# Patient Record
Sex: MALE | Race: BLACK OR AFRICAN AMERICAN | Employment: FULL TIME | URBAN - METROPOLITAN AREA
[De-identification: names, ages, dates, MRNs, and addresses within clinical notes are randomized per-mention and may not be internally consistent; named-entity substitution may affect disease eponyms.]

---

## 2022-05-13 ENCOUNTER — HOSPITAL ENCOUNTER (INPATIENT)
Age: 32
LOS: 1 days | Discharge: HOME | DRG: 871 | End: 2022-05-14
Attending: EMERGENCY MEDICINE | Admitting: INTERNAL MEDICINE

## 2022-05-13 ENCOUNTER — APPOINTMENT (OUTPATIENT)
Dept: CT IMAGING | Age: 32
DRG: 871 | End: 2022-05-13

## 2022-05-13 ENCOUNTER — APPOINTMENT (OUTPATIENT)
Dept: GENERAL RADIOLOGY | Age: 32
DRG: 871 | End: 2022-05-13

## 2022-05-13 DIAGNOSIS — J69.0 ASPIRATION PNEUMONIA, UNSPECIFIED ASPIRATION PNEUMONIA TYPE, UNSPECIFIED LATERALITY, UNSPECIFIED PART OF LUNG (HCC): ICD-10-CM

## 2022-05-13 DIAGNOSIS — J96.01 ACUTE RESPIRATORY FAILURE WITH HYPOXEMIA (HCC): Primary | ICD-10-CM

## 2022-05-13 DIAGNOSIS — R56.9 SEIZURE-LIKE ACTIVITY (HCC): ICD-10-CM

## 2022-05-13 DIAGNOSIS — R41.82 ALTERED MENTAL STATUS, UNSPECIFIED ALTERED MENTAL STATUS TYPE: ICD-10-CM

## 2022-05-13 PROBLEM — F14.10 COCAINE ABUSE (HCC): Status: ACTIVE | Noted: 2022-05-13

## 2022-05-13 PROBLEM — Z72.0 TOBACCO ABUSE: Status: ACTIVE | Noted: 2022-05-13

## 2022-05-13 PROBLEM — F12.10 CANNABIS ABUSE: Status: ACTIVE | Noted: 2022-05-13

## 2022-05-13 LAB
A/G RATIO: 1.4 (ref 1.1–2.2)
ACETAMINOPHEN LEVEL: <5 UG/ML (ref 10–30)
ALBUMIN SERPL-MCNC: 4.6 G/DL (ref 3.4–5)
ALP BLD-CCNC: 67 U/L (ref 40–129)
ALT SERPL-CCNC: 38 U/L (ref 10–40)
AMPHETAMINE SCREEN, URINE: ABNORMAL
ANION GAP SERPL CALCULATED.3IONS-SCNC: 19 MMOL/L (ref 3–16)
APTT: 27.2 SEC (ref 26.2–38.6)
AST SERPL-CCNC: 60 U/L (ref 15–37)
BACTERIA: NORMAL /HPF
BARBITURATE SCREEN URINE: ABNORMAL
BASE EXCESS VENOUS: -8.1 MMOL/L (ref -3–3)
BASOPHILS ABSOLUTE: 0.1 K/UL (ref 0–0.2)
BASOPHILS RELATIVE PERCENT: 0.8 %
BENZODIAZEPINE SCREEN, URINE: ABNORMAL
BILIRUB SERPL-MCNC: 0.3 MG/DL (ref 0–1)
BILIRUBIN URINE: NEGATIVE
BLOOD, URINE: NEGATIVE
BUN BLDV-MCNC: 15 MG/DL (ref 7–20)
CALCIUM SERPL-MCNC: 9.3 MG/DL (ref 8.3–10.6)
CANNABINOID SCREEN URINE: POSITIVE
CARBOXYHEMOGLOBIN: 7.1 % (ref 0–1.5)
CHLORIDE BLD-SCNC: 100 MMOL/L (ref 99–110)
CLARITY: CLEAR
CO2: 20 MMOL/L (ref 21–32)
COCAINE METABOLITE SCREEN URINE: POSITIVE
COLOR: YELLOW
CREAT SERPL-MCNC: 1.3 MG/DL (ref 0.9–1.3)
EKG ATRIAL RATE: 103 BPM
EKG DIAGNOSIS: NORMAL
EKG P AXIS: 53 DEGREES
EKG P-R INTERVAL: 130 MS
EKG Q-T INTERVAL: 380 MS
EKG QRS DURATION: 88 MS
EKG QTC CALCULATION (BAZETT): 497 MS
EKG R AXIS: 57 DEGREES
EKG T AXIS: 48 DEGREES
EKG VENTRICULAR RATE: 103 BPM
EOSINOPHILS ABSOLUTE: 0.1 K/UL (ref 0–0.6)
EOSINOPHILS RELATIVE PERCENT: 0.9 %
EPITHELIAL CELLS, UA: 0 /HPF (ref 0–5)
ETHANOL: 28 MG/DL (ref 0–0.08)
GFR AFRICAN AMERICAN: >60
GFR NON-AFRICAN AMERICAN: >60
GLUCOSE BLD-MCNC: 113 MG/DL (ref 70–99)
GLUCOSE BLD-MCNC: 114 MG/DL (ref 70–99)
GLUCOSE BLD-MCNC: 134 MG/DL (ref 70–99)
GLUCOSE BLD-MCNC: 206 MG/DL (ref 70–99)
GLUCOSE BLD-MCNC: 219 MG/DL (ref 70–99)
GLUCOSE URINE: NEGATIVE MG/DL
HCO3 VENOUS: 22 MMOL/L (ref 23–29)
HCT VFR BLD CALC: 39.9 % (ref 40.5–52.5)
HEMOGLOBIN, VEN, REDUCED: 9 %
HEMOGLOBIN: 12.4 G/DL (ref 13.5–17.5)
HYALINE CASTS: 1 /LPF (ref 0–8)
INR BLD: 0.96 (ref 0.88–1.12)
KETONES, URINE: NEGATIVE MG/DL
LACTIC ACID, SEPSIS: 0.8 MMOL/L (ref 0.4–1.9)
LACTIC ACID, SEPSIS: 3.2 MMOL/L (ref 0.4–1.9)
LACTIC ACID, SEPSIS: 4.9 MMOL/L (ref 0.4–1.9)
LEUKOCYTE ESTERASE, URINE: NEGATIVE
LIPASE: 63 U/L (ref 13–60)
LYMPHOCYTES ABSOLUTE: 3.4 K/UL (ref 1–5.1)
LYMPHOCYTES RELATIVE PERCENT: 25.7 %
Lab: ABNORMAL
MCH RBC QN AUTO: 27.7 PG (ref 26–34)
MCHC RBC AUTO-ENTMCNC: 31.1 G/DL (ref 31–36)
MCV RBC AUTO: 89.2 FL (ref 80–100)
METHADONE SCREEN, URINE: ABNORMAL
METHEMOGLOBIN VENOUS: 0.7 %
MICROSCOPIC EXAMINATION: YES
MONOCYTES ABSOLUTE: 0.9 K/UL (ref 0–1.3)
MONOCYTES RELATIVE PERCENT: 7 %
NEUTROPHILS ABSOLUTE: 8.6 K/UL (ref 1.7–7.7)
NEUTROPHILS RELATIVE PERCENT: 65.6 %
NITRITE, URINE: NEGATIVE
O2 CONTENT, VEN: 15 VOL %
O2 SAT, VEN: 91 %
O2 THERAPY: ABNORMAL
OPIATE SCREEN URINE: ABNORMAL
OXYCODONE URINE: ABNORMAL
PCO2, VEN: 65.9 MMHG (ref 40–50)
PDW BLD-RTO: 13.8 % (ref 12.4–15.4)
PERFORMED ON: ABNORMAL
PH UA: 6.5
PH UA: 6.5 (ref 5–8)
PH VENOUS: 7.13 (ref 7.35–7.45)
PHENCYCLIDINE SCREEN URINE: ABNORMAL
PLATELET # BLD: 353 K/UL (ref 135–450)
PMV BLD AUTO: 8.2 FL (ref 5–10.5)
PO2, VEN: 75.7 MMHG (ref 25–40)
POTASSIUM SERPL-SCNC: 3.7 MMOL/L (ref 3.5–5.1)
PRO-BNP: 85 PG/ML (ref 0–124)
PROPOXYPHENE SCREEN: ABNORMAL
PROTEIN UA: 30 MG/DL
PROTHROMBIN TIME: 10.8 SEC (ref 9.9–12.7)
RBC # BLD: 4.47 M/UL (ref 4.2–5.9)
RBC UA: 1 /HPF (ref 0–4)
SALICYLATE, SERUM: <0.3 MG/DL (ref 15–30)
SODIUM BLD-SCNC: 139 MMOL/L (ref 136–145)
SPECIFIC GRAVITY UA: 1.01 (ref 1–1.03)
TCO2 CALC VENOUS: 54 MMOL/L
TOTAL CK: 348 U/L (ref 39–308)
TOTAL PROTEIN: 7.9 G/DL (ref 6.4–8.2)
TROPONIN: <0.01 NG/ML
URINE REFLEX TO CULTURE: ABNORMAL
URINE TYPE: ABNORMAL
UROBILINOGEN, URINE: 0.2 E.U./DL
WBC # BLD: 13.1 K/UL (ref 4–11)
WBC UA: 2 /HPF (ref 0–5)

## 2022-05-13 PROCEDURE — 85730 THROMBOPLASTIN TIME PARTIAL: CPT

## 2022-05-13 PROCEDURE — 71045 X-RAY EXAM CHEST 1 VIEW: CPT

## 2022-05-13 PROCEDURE — 85025 COMPLETE CBC W/AUTO DIFF WBC: CPT

## 2022-05-13 PROCEDURE — XW03396 INTRODUCTION OF CEFTOLOZANE/TAZOBACTAM ANTI-INFECTIVE INTO PERIPHERAL VEIN, PERCUTANEOUS APPROACH, NEW TECHNOLOGY GROUP 6: ICD-10-PCS | Performed by: INTERNAL MEDICINE

## 2022-05-13 PROCEDURE — 70450 CT HEAD/BRAIN W/O DYE: CPT

## 2022-05-13 PROCEDURE — 83036 HEMOGLOBIN GLYCOSYLATED A1C: CPT

## 2022-05-13 PROCEDURE — 83605 ASSAY OF LACTIC ACID: CPT

## 2022-05-13 PROCEDURE — 2580000003 HC RX 258: Performed by: PHYSICIAN ASSISTANT

## 2022-05-13 PROCEDURE — 80179 DRUG ASSAY SALICYLATE: CPT

## 2022-05-13 PROCEDURE — 83880 ASSAY OF NATRIURETIC PEPTIDE: CPT

## 2022-05-13 PROCEDURE — 80143 DRUG ASSAY ACETAMINOPHEN: CPT

## 2022-05-13 PROCEDURE — 95816 EEG AWAKE AND DROWSY: CPT | Performed by: NEUROMUSCULOSKELETAL MEDICINE & OMM

## 2022-05-13 PROCEDURE — 93005 ELECTROCARDIOGRAM TRACING: CPT | Performed by: PHYSICIAN ASSISTANT

## 2022-05-13 PROCEDURE — 6360000002 HC RX W HCPCS: Performed by: PHYSICIAN ASSISTANT

## 2022-05-13 PROCEDURE — 82550 ASSAY OF CK (CPK): CPT

## 2022-05-13 PROCEDURE — 2580000003 HC RX 258: Performed by: INTERNAL MEDICINE

## 2022-05-13 PROCEDURE — 82803 BLOOD GASES ANY COMBINATION: CPT

## 2022-05-13 PROCEDURE — 83690 ASSAY OF LIPASE: CPT

## 2022-05-13 PROCEDURE — 99285 EMERGENCY DEPT VISIT HI MDM: CPT

## 2022-05-13 PROCEDURE — 6360000002 HC RX W HCPCS: Performed by: INTERNAL MEDICINE

## 2022-05-13 PROCEDURE — 85610 PROTHROMBIN TIME: CPT

## 2022-05-13 PROCEDURE — 36415 COLL VENOUS BLD VENIPUNCTURE: CPT

## 2022-05-13 PROCEDURE — 94761 N-INVAS EAR/PLS OXIMETRY MLT: CPT

## 2022-05-13 PROCEDURE — 2060000000 HC ICU INTERMEDIATE R&B

## 2022-05-13 PROCEDURE — 6370000000 HC RX 637 (ALT 250 FOR IP): Performed by: STUDENT IN AN ORGANIZED HEALTH CARE EDUCATION/TRAINING PROGRAM

## 2022-05-13 PROCEDURE — 95819 EEG AWAKE AND ASLEEP: CPT

## 2022-05-13 PROCEDURE — 93010 ELECTROCARDIOGRAM REPORT: CPT | Performed by: INTERNAL MEDICINE

## 2022-05-13 PROCEDURE — 81001 URINALYSIS AUTO W/SCOPE: CPT

## 2022-05-13 PROCEDURE — 6370000000 HC RX 637 (ALT 250 FOR IP): Performed by: INTERNAL MEDICINE

## 2022-05-13 PROCEDURE — 80307 DRUG TEST PRSMV CHEM ANLYZR: CPT

## 2022-05-13 PROCEDURE — 87449 NOS EACH ORGANISM AG IA: CPT

## 2022-05-13 PROCEDURE — 82077 ASSAY SPEC XCP UR&BREATH IA: CPT

## 2022-05-13 PROCEDURE — 96360 HYDRATION IV INFUSION INIT: CPT

## 2022-05-13 PROCEDURE — 95816 EEG AWAKE AND DROWSY: CPT | Performed by: INTERNAL MEDICINE

## 2022-05-13 PROCEDURE — 80053 COMPREHEN METABOLIC PANEL: CPT

## 2022-05-13 PROCEDURE — 84484 ASSAY OF TROPONIN QUANT: CPT

## 2022-05-13 PROCEDURE — 99253 IP/OBS CNSLTJ NEW/EST LOW 45: CPT | Performed by: NEUROMUSCULOSKELETAL MEDICINE & OMM

## 2022-05-13 PROCEDURE — 2700000000 HC OXYGEN THERAPY PER DAY

## 2022-05-13 RX ORDER — POTASSIUM CHLORIDE 7.45 MG/ML
10 INJECTION INTRAVENOUS PRN
Status: DISCONTINUED | OUTPATIENT
Start: 2022-05-13 | End: 2022-05-14 | Stop reason: HOSPADM

## 2022-05-13 RX ORDER — CLONIDINE HYDROCHLORIDE 0.1 MG/1
0.1 TABLET ORAL PRN
Status: DISCONTINUED | OUTPATIENT
Start: 2022-05-13 | End: 2022-05-14 | Stop reason: HOSPADM

## 2022-05-13 RX ORDER — LANOLIN ALCOHOL/MO/W.PET/CERES
3 CREAM (GRAM) TOPICAL NIGHTLY
Status: DISCONTINUED | OUTPATIENT
Start: 2022-05-13 | End: 2022-05-14 | Stop reason: HOSPADM

## 2022-05-13 RX ORDER — ONDANSETRON 2 MG/ML
4 INJECTION INTRAMUSCULAR; INTRAVENOUS EVERY 6 HOURS PRN
Status: DISCONTINUED | OUTPATIENT
Start: 2022-05-13 | End: 2022-05-14 | Stop reason: HOSPADM

## 2022-05-13 RX ORDER — ONDANSETRON 4 MG/1
4 TABLET, ORALLY DISINTEGRATING ORAL EVERY 8 HOURS PRN
Status: DISCONTINUED | OUTPATIENT
Start: 2022-05-13 | End: 2022-05-14 | Stop reason: HOSPADM

## 2022-05-13 RX ORDER — ACETAMINOPHEN 650 MG/1
650 SUPPOSITORY RECTAL EVERY 6 HOURS PRN
Status: DISCONTINUED | OUTPATIENT
Start: 2022-05-13 | End: 2022-05-14 | Stop reason: HOSPADM

## 2022-05-13 RX ORDER — SODIUM CHLORIDE, SODIUM LACTATE, POTASSIUM CHLORIDE, CALCIUM CHLORIDE 600; 310; 30; 20 MG/100ML; MG/100ML; MG/100ML; MG/100ML
INJECTION, SOLUTION INTRAVENOUS CONTINUOUS
Status: DISCONTINUED | OUTPATIENT
Start: 2022-05-13 | End: 2022-05-14 | Stop reason: HOSPADM

## 2022-05-13 RX ORDER — SODIUM CHLORIDE 9 MG/ML
INJECTION, SOLUTION INTRAVENOUS PRN
Status: DISCONTINUED | OUTPATIENT
Start: 2022-05-13 | End: 2022-05-14 | Stop reason: HOSPADM

## 2022-05-13 RX ORDER — 0.9 % SODIUM CHLORIDE 0.9 %
1000 INTRAVENOUS SOLUTION INTRAVENOUS ONCE
Status: COMPLETED | OUTPATIENT
Start: 2022-05-13 | End: 2022-05-13

## 2022-05-13 RX ORDER — ENOXAPARIN SODIUM 100 MG/ML
40 INJECTION SUBCUTANEOUS DAILY
Status: DISCONTINUED | OUTPATIENT
Start: 2022-05-13 | End: 2022-05-14 | Stop reason: HOSPADM

## 2022-05-13 RX ORDER — INSULIN LISPRO 100 [IU]/ML
0-3 INJECTION, SOLUTION INTRAVENOUS; SUBCUTANEOUS NIGHTLY
Status: DISCONTINUED | OUTPATIENT
Start: 2022-05-13 | End: 2022-05-14 | Stop reason: HOSPADM

## 2022-05-13 RX ORDER — MAGNESIUM SULFATE 1 G/100ML
1000 INJECTION INTRAVENOUS PRN
Status: DISCONTINUED | OUTPATIENT
Start: 2022-05-13 | End: 2022-05-14 | Stop reason: HOSPADM

## 2022-05-13 RX ORDER — TRAMADOL HYDROCHLORIDE 50 MG/1
50 TABLET ORAL PRN
Status: DISCONTINUED | OUTPATIENT
Start: 2022-05-13 | End: 2022-05-14 | Stop reason: HOSPADM

## 2022-05-13 RX ORDER — DEXTROSE MONOHYDRATE 50 MG/ML
100 INJECTION, SOLUTION INTRAVENOUS PRN
Status: DISCONTINUED | OUTPATIENT
Start: 2022-05-13 | End: 2022-05-14 | Stop reason: HOSPADM

## 2022-05-13 RX ORDER — INSULIN LISPRO 100 [IU]/ML
0-6 INJECTION, SOLUTION INTRAVENOUS; SUBCUTANEOUS
Status: DISCONTINUED | OUTPATIENT
Start: 2022-05-13 | End: 2022-05-14 | Stop reason: HOSPADM

## 2022-05-13 RX ORDER — SODIUM CHLORIDE, SODIUM LACTATE, POTASSIUM CHLORIDE, CALCIUM CHLORIDE 600; 310; 30; 20 MG/100ML; MG/100ML; MG/100ML; MG/100ML
INJECTION, SOLUTION INTRAVENOUS CONTINUOUS
Status: DISCONTINUED | OUTPATIENT
Start: 2022-05-13 | End: 2022-05-13

## 2022-05-13 RX ORDER — ACETAMINOPHEN 325 MG/1
650 TABLET ORAL EVERY 6 HOURS PRN
Status: DISCONTINUED | OUTPATIENT
Start: 2022-05-13 | End: 2022-05-14 | Stop reason: HOSPADM

## 2022-05-13 RX ORDER — SODIUM CHLORIDE 0.9 % (FLUSH) 0.9 %
10 SYRINGE (ML) INJECTION PRN
Status: DISCONTINUED | OUTPATIENT
Start: 2022-05-13 | End: 2022-05-14 | Stop reason: HOSPADM

## 2022-05-13 RX ORDER — NICOTINE 21 MG/24HR
1 PATCH, TRANSDERMAL 24 HOURS TRANSDERMAL DAILY
Status: DISCONTINUED | OUTPATIENT
Start: 2022-05-13 | End: 2022-05-14 | Stop reason: HOSPADM

## 2022-05-13 RX ORDER — POTASSIUM CHLORIDE 20 MEQ/1
40 TABLET, EXTENDED RELEASE ORAL PRN
Status: DISCONTINUED | OUTPATIENT
Start: 2022-05-13 | End: 2022-05-14 | Stop reason: HOSPADM

## 2022-05-13 RX ORDER — LORAZEPAM 2 MG/ML
4 INJECTION INTRAMUSCULAR
Status: ACTIVE | OUTPATIENT
Start: 2022-05-13 | End: 2022-05-13

## 2022-05-13 RX ORDER — TRAZODONE HYDROCHLORIDE 50 MG/1
50 TABLET ORAL NIGHTLY PRN
Status: DISCONTINUED | OUTPATIENT
Start: 2022-05-13 | End: 2022-05-14 | Stop reason: HOSPADM

## 2022-05-13 RX ORDER — ONDANSETRON 2 MG/ML
4 INJECTION INTRAMUSCULAR; INTRAVENOUS EVERY 6 HOURS PRN
Status: DISCONTINUED | OUTPATIENT
Start: 2022-05-13 | End: 2022-05-13

## 2022-05-13 RX ORDER — LANOLIN ALCOHOL/MO/W.PET/CERES
3 CREAM (GRAM) TOPICAL NIGHTLY PRN
Status: DISCONTINUED | OUTPATIENT
Start: 2022-05-13 | End: 2022-05-14 | Stop reason: HOSPADM

## 2022-05-13 RX ORDER — ALBUTEROL SULFATE 2.5 MG/3ML
2.5 SOLUTION RESPIRATORY (INHALATION) EVERY 4 HOURS PRN
Status: DISCONTINUED | OUTPATIENT
Start: 2022-05-13 | End: 2022-05-14 | Stop reason: HOSPADM

## 2022-05-13 RX ADMIN — SODIUM CHLORIDE, POTASSIUM CHLORIDE, SODIUM LACTATE AND CALCIUM CHLORIDE: 600; 310; 30; 20 INJECTION, SOLUTION INTRAVENOUS at 10:43

## 2022-05-13 RX ADMIN — PIPERACILLIN AND TAZOBACTAM 3375 MG: 3; .375 INJECTION, POWDER, LYOPHILIZED, FOR SOLUTION INTRAVENOUS at 02:59

## 2022-05-13 RX ADMIN — SODIUM CHLORIDE 3000 MG: 900 INJECTION INTRAVENOUS at 10:48

## 2022-05-13 RX ADMIN — ENOXAPARIN SODIUM 40 MG: 100 INJECTION SUBCUTANEOUS at 10:48

## 2022-05-13 RX ADMIN — MELATONIN TAB 3 MG 3 MG: 3 TAB at 21:50

## 2022-05-13 RX ADMIN — SODIUM CHLORIDE 3000 MG: 900 INJECTION INTRAVENOUS at 16:02

## 2022-05-13 RX ADMIN — SODIUM CHLORIDE 1000 ML: 9 INJECTION, SOLUTION INTRAVENOUS at 00:56

## 2022-05-13 RX ADMIN — SODIUM CHLORIDE, POTASSIUM CHLORIDE, SODIUM LACTATE AND CALCIUM CHLORIDE: 600; 310; 30; 20 INJECTION, SOLUTION INTRAVENOUS at 19:08

## 2022-05-13 RX ADMIN — SODIUM CHLORIDE: 9 INJECTION, SOLUTION INTRAVENOUS at 10:44

## 2022-05-13 RX ADMIN — SODIUM CHLORIDE 3000 MG: 900 INJECTION INTRAVENOUS at 21:55

## 2022-05-13 ASSESSMENT — PAIN SCALES - GENERAL
PAINLEVEL_OUTOF10: 0
PAINLEVEL_OUTOF10: 0

## 2022-05-13 NOTE — ED PROVIDER NOTES
[facility]        Pt Name: Emmanuel Jose  MRN: [id number]  [place] 1990  Date of evaluation: 5/13/2022  Provider: Chapin Mcfarland PA-C  PCP: No primary care provider on file. Note Started: 2:48 AM EDT        I have seen and evaluated this patient with my supervising physician Sajan Ryan MD.    [address]       Chief Complaint   Patient presents with    Loss of Consciousness     in by EMS from car, [place]. patient responding to narcan, however girlfriend reports seizure history. HISTORY OF PRESENT ILLNESS   (Location, Timing/Onset, Context/Setting, Quality, Duration, Modifying Factors, Severity, Associated Signs and Symptoms)  Note limiting factors. Chief Complaint: Altered mental status    Emmanuel Jose is a 32 y.o. male who presents to the emergency department today for evaluation for altered mental status. EMS was dispatched to a young male, sitting in the car, unresponsive. They did give the patient 4 of Narcan intranasally, and to IV, and the patient did become more awake however when he arrives to the ED, he is snoring, does have some secretions noted. Friend is now at bedside stating that the patient did smoke marijuana but otherwise does not use any other drugs. She did witness what appeared to be seizure-like activity. No fall or head injury, as this occurred while he was sitting in the car  Nursing Notes were all reviewed and agreed with or any disagreements were addressed in the HPI. REVIEW OF SYSTEMS    (2-9 systems for level 4, 10 or more for level 5)     Review of Systems   Unable to perform ROS: Mental status change       Positives and Pertinent negatives as per HPI. Except as noted above in the ROS, all other systems were reviewed and negative. PAST MEDICAL HISTORY   History reviewed. No pertinent past medical history.       SURGICAL HISTORY   History reviewed. No pertinent surgical history. CURRENTMEDICATIONS       Previous Medications    No medications on file         ALLERGIES     Patient has no known allergies. FAMILYHISTORY     History reviewed. No pertinent family history. SOCIAL HISTORY       Social History     Tobacco Use    Smoking status: Current Every Day Smoker     Packs/day: 2.00     Types: Cigars    Smokeless tobacco: Never Used   Substance Use Topics    Alcohol use: Yes     Comment: SOCIALLY    Drug use: No       SCREENINGS    [place] Coma Scale  Eye Opening: To speech  Best Verbal Response: Incomprehensible speech  Best Motor Response: Localizes pain  [place] Coma Scale Score: 10        PHYSICAL EXAM    (up to 7 for level 4, 8 or more for level 5)     ED Triage Vitals   BP Temp Temp Source Pulse Resp SpO2 Height Weight   05/13/22 0012 05/13/22 0110 05/13/22 0110 05/13/22 0012 05/13/22 0012 05/13/22 0012 -- --   (!) 150/84 95.9 °F (35.5 °C) Oral 118 30 (!) 88 %         Physical Exam  Vitals and nursing note reviewed. Constitutional:       Appearance: He is well-developed. He is not diaphoretic. HENT:      Head: Normocephalic and atraumatic. Right Ear: External ear normal.      Left Ear: External ear normal.      Nose: Nose normal.      Mouth/Throat:      Mouth: Mucous membranes are moist.      Pharynx: Oropharynx is clear. No posterior oropharyngeal erythema. Eyes:      General:         Right eye: No discharge. Left eye: No discharge. Comments: Pupils are pinpoint   Neck:      Trachea: No tracheal deviation. Cardiovascular:      Rate and Rhythm: Regular rhythm. Tachycardia present. Heart sounds: No murmur heard. Pulmonary:      Effort: Pulmonary effort is normal. No respiratory distress. Breath sounds: Rhonchi present. Comments: Scattered rhonchorous breath sounds throughout lung field  Abdominal:      General: Bowel sounds are normal. There is no distension.       Palpations: Abdomen is soft. Tenderness: There is no abdominal tenderness. Musculoskeletal:         General: Normal range of motion. Cervical back: Normal range of motion and neck supple. Skin:     General: Skin is warm and dry. Neurological:      General: No focal deficit present. Mental Status: He is alert. He is confused. GCS: GCS eye subscore is 4. GCS verbal subscore is 4. GCS motor subscore is 5. Comments: Pulls are pinpoint bilaterally. Patient will awaken to name, and will move his head when trying to place oxygen on. He will follow some commands and moving his arms and legs of the bed.    Psychiatric:         Behavior: Behavior normal.         DIAGNOSTIC RESULTS   LABS:    Labs Reviewed   CBC WITH AUTO DIFFERENTIAL - Abnormal; Notable for the following components:       Result Value    WBC 13.1 (*)     Hemoglobin 12.4 (*)     Hematocrit 39.9 (*)     Neutrophils Absolute 8.6 (*)     All other components within normal limits   COMPREHENSIVE METABOLIC PANEL - Abnormal; Notable for the following components:    CO2 20 (*)     Anion Gap 19 (*)     Glucose 219 (*)     AST 60 (*)     All other components within normal limits   LIPASE - Abnormal; Notable for the following components:    Lipase 63.0 (*)     All other components within normal limits   URINALYSIS WITH REFLEX TO CULTURE - Abnormal; Notable for the following components:    Protein, UA 30 (*)     All other components within normal limits   ACETAMINOPHEN LEVEL - Abnormal; Notable for the following components:    Acetaminophen Level <5 (*)     All other components within normal limits   SALICYLATE LEVEL - Abnormal; Notable for the following components:    Salicylate, Serum <5.5 (*)     All other components within normal limits   BLOOD GAS, VENOUS - Abnormal; Notable for the following components:    pH, Vicente 7.131 (*)     pCO2, Vicente 65.9 (*)     pO2, Vicente 75.7 (*)     HCO3, Venous 22.0 (*)     Base Excess, Vicente -8.1 (*)     Carboxyhemoglobin 7.1 (*) All other components within normal limits    Narrative:     Sobeida August  [facility] tel. [phone],  Chemistry results called to and read back by HAROON Rey, 05/13/2022  00:37, by [facility]   LACTATE, SEPSIS - Abnormal; Notable for the following components:    Lactic Acid, Sepsis 4.9 (*)     All other components within normal limits    Narrative:     Sobeida August  [facility] tel. [phone],  Chemistry results called to and read back by HAROON Rey, 05/13/2022  00:53, by Orion Marin - Abnormal; Notable for the following components:    Cannabinoid Scrn, Ur POSITIVE (*)     Cocaine Metabolite Screen, Urine POSITIVE (*)     All other components within normal limits   CK - Abnormal; Notable for the following components: Total  (*)     All other components within normal limits   POCT GLUCOSE - Abnormal; Notable for the following components:    POC Glucose 206 (*)     All other components within normal limits   TROPONIN   APTT   PROTIME-INR   BRAIN NATRIURETIC PEPTIDE   ETHANOL   MICROSCOPIC URINALYSIS   LACTATE, SEPSIS       When ordered only abnormal lab results are displayed. All other labs were within normal range or not returned as of this dictation. EKG: When ordered, EKG's are interpreted by the Emergency Department Physician in the absence of a cardiologist.  Please see their note for interpretation of EKG. RADIOLOGY:   Non-plain film images such as CT, Ultrasound and MRI are read by the radiologist. Plain radiographic images are visualized and preliminarily interpreted by the ED Provider with the below findings:        Interpretation per the Radiologist below, if available at the time of this note:    XR CHEST PORTABLE   Final Result   No acute abnormality. CT HEAD WO CONTRAST   Final Result   Motion limited exam demonstrates no acute intracranial abnormality.            CT HEAD WO CONTRAST    Result Date: 5/13/2022  EXAMINATION: CT OF THE HEAD WITHOUT CONTRAST  5/13/2022 12:20 am TECHNIQUE: CT of the head was performed without the administration of intravenous contrast. Automated exposure control, iterative reconstruction, and/or weight based adjustment of the mA/kV was utilized to reduce the radiation dose to as low as reasonably achievable. COMPARISON: None. HISTORY: ORDERING SYSTEM PROVIDED HISTORY: ams/seisure disorder FINDINGS: Motion artifact is present. BRAIN/VENTRICLES: There is no acute intracranial hemorrhage, mass effect or midline shift. No abnormal extra-axial fluid collection. The gray-white differentiation is maintained without evidence of an acute infarct. There is no evidence of hydrocephalus. ORBITS: The visualized portion of the orbits demonstrate no acute abnormality. SINUSES: Mucous retention cyst or polyp in the right sphenoid sinus. Remainder of the visualized paranasal sinuses and mastoid air cells demonstrate no acute abnormality. SOFT TISSUES/SKULL:  No acute abnormality of the visualized skull or soft tissues. Motion limited exam demonstrates no acute intracranial abnormality. XR CHEST PORTABLE    Result Date: 5/13/2022  EXAMINATION: ONE XRAY VIEW OF THE CHEST 5/13/2022 12:56 am COMPARISON: None. HISTORY: Reason for Exam: hypoxia, ams/loss of consciousness FINDINGS: Heart size is normal. No focal consolidation in the lungs. No pleural effusion or pneumothorax. No acute abnormality. PROCEDURES   Unless otherwise noted below, none     Procedures    CRITICAL CARE TIME   Critical Care  There was a high probability of life-threatening clinical deterioration in the patient's condition requiring my urgent intervention. Total critical care time with the patient was 35 minutes excluding separately reportable procedures.   Critical care required due to patients respiratory failure with hypoxemia requiring supplemental oxygen, extensive work-up, admission      CONSULTS:  [facility] and DIFFERENTIAL DIAGNOSIS/MDM:   Vitals:    Vitals:    05/13/22 0030 05/13/22 0045 05/13/22 0100 05/13/22 0110   BP:  (!) 157/92 (!) 158/88    Pulse: 101 89 91    Resp: 8      Temp:    95.9 °F (35.5 °C)   TempSrc:    Oral   SpO2: 92% 97% 95%        Patient was given the following medications:  Medications   ondansetron (ZOFRAN) injection 4 mg (has no administration in time range)   piperacillin-tazobactam (ZOSYN) 3,375 mg in dextrose 5 % 50 mL IVPB (mini-bag) (has no administration in time range)   0.9 % sodium chloride bolus (0 mLs IntraVENous Stopped 5/13/22 0202)         Is this patient to be included in the SEP-1 Core Measure due to severe sepsis or septic shock? No   Exclusion criteria - the patient is NOT to be included for SEP-1 Core Measure due to: Alternative explanation for abnormal labs/vitals that do not relate to sepsis, see MDM for further explanation    Briefly, this is a 22-year-old male who presents to the emergency department today for evaluation for altered mental status. EMS was called as the patient was found unresponsive sitting in the car. Girlfriend at bedside does states that the patient did smoke marijuana, and she did report possible seizure-like activity. When EMS arrived they stated that the patient was unresponsive they did give him 4 of Narcan intranasally, as well as to IV and he did become more responsive. When the patient arrives to the ED, he does have some snoring respirations noted, patient was noted to be hypoxic upon room air. Patient was placed on supplemental oxygen. He does have rhonchorous breath sounds throughout all lung fields    2 of Narcan was given IV, the patient became more awake, alert, is now more responsive. Is answering some questions    EKG will be documented by my attending, please see his note for further details. Vickie Brentonalex His CBC shows leukocytosis of 13.1, there is mild anemia. His CMP shows glucose of 219 however is otherwise unremarkable. Troponin is negative. BNP is normal.    Patient denies any alcohol use however his ethanol is 28. Salicylate level and Tylenol levels negative. Patient does have an elevated lactic acidosis, as well as a pH of 7.13, however I do not suspect severe sepsis, I believe that this is likely due to a seizure like activity. Will reassess after fluids. CT of head and chest x-ray otherwise unremarkable    Patient denies any other drug use however he is positive for cannabinoids and cocaine. Due to his possible seizure-like activity, hypoxemia, and altered mental status will need to be admitted for further care and evaluation. Covered with Smart Cube. Hospitalist has been paged for admission, patient is stable for mission    FINAL IMPRESSION      1. Acute respiratory failure with hypoxemia (HCC)    2. Seizure-like activity ([facility] [address]    3. Aspiration pneumonia, unspecified aspiration pneumonia type, unspecified laterality, unspecified part of lung ([facility] [address]    4. Altered mental status, unspecified altered mental status type          DISPOSITION/PLAN   DISPOSITION Admitted 05/13/2022 02:24:53 AM      PATIENT REFERRED TO:  No follow-up provider specified.     DISCHARGE MEDICATIONS:  New Prescriptions    No medications on file       DISCONTINUED MEDICATIONS:  Discontinued Medications    No medications on file              (Please note that portions of this note were completed with a voice recognition program.  Efforts were made to edit the dictations but occasionally words are mis-transcribed.)    Deena Larkin PA-C (electronically signed)           Deena Larkin PA-C  05/13/22 0255

## 2022-05-13 NOTE — PROGRESS NOTES
Shift assessment completed. Currently on 6L of oxygen via nasal cannula. Pt sleeping and snoring. Unable to respond when called name. Pt alert after sternal rub. Oriented X 4. Able to follow commands. VSS. No skin issues noted. Pt unable to stay awake. Family bedside. Discussed plan of the care with pt and family. Will continue to monitor.

## 2022-05-13 NOTE — PROGRESS NOTES
4 Eyes Skin Assessment     NAME:  Manan Vásquez  YOB: 1990  MEDICAL RECORD NUMBER:  [id number]    The patient is being assess for  Admission    I agree that 2 RN's have performed a thorough Head to Toe Skin Assessment on the patient. ALL assessment sites listed below have been assessed. Areas assessed by both nurses:    Head, Face, Ears, Shoulders, Back, Chest, Arms, Elbows, Hands, Sacrum. Buttock, Coccyx, Ischium and Legs. Feet and Heels        Does the Patient have a Wound?  No noted wound(s)       Sanjay Prevention initiated:  NA   Wound Care Orders initiated:  NA    Pressure Injury (Stage 3,4, Unstageable, DTI, NWPT, and Complex wounds) if present place consult order under [de-identified] NA    New and Established Ostomies if present place consult order under : NA      Nurse 1 eSignature: Electronically signed by Daren Nunez RN on 5/13/22 at 7:28 AM EDT    **SHARE this note so that the co-signing nurse is able to place an eSignature**    Nurse 2 eSignature: Electronically signed by Danny Payan RN on 5/13/22 at 8:02 AM EDT

## 2022-05-13 NOTE — ED PROVIDER NOTES
[facility] [facility]    Physician Attestation    Pt Name: Anabelle Davis  MRN: [id number]  [place] 1990  Date of evaluation: 5/13/22        Physician Note:    I havepersonally performed and/or participated in the history, exam and medical decision making and agree with all pertinent clinical information. I have also reviewed and agree with the past medical, family and social historyunless otherwise noted. I have personally performed a face to face diagnostic evaluation onthis patient. I have reviewed the mid-levels findings and agree. History: She was found in a car in a parking center not breathing squad gave Narcan he did not awaken but his breathing improved girlfriend states he has a remote history of seizures but not any for 10 years patient is hypoxic at this time does arouse is able to verbalize a little bit at this time      REVIEW OF SYSTEMS    Unable to provide any history at this time due to his condition      General Appearance:   drowsy cooperative, no distress, appears stated age. Head:  Normocephalic, without obvious abnormality, atraumatic. Eyes:  conjunctiva/corneas clear,   Sclera anicteric. ENT: Mucous membranes moist.   Neck: Supple, symmetrical, trachea midline, no adenopathy. No jugular venous distention. Lungs:   No Respiratory Distress. no rales some rhonchi no  rub   Chest Wall:  Nontender  no deformity   Heart:  Rsr no murmer gallop    Abdomen:   Soft nontender no organomegally    Extremities:  Full range of motion. no deformity   Pulses: Equal  upper and lower    Skin:  No rashes or lesions to exposed skin.    Neurologic:  Dicky Goodell is able to converse   EKG Interpretation    Interpreted by emergency department physician    Rhythm: sinus tachycardia  Rate: 103  Axis: normal  Ectopy: none  Conduction: normal  ST Segments: no acute change  T Waves: no acute change  Q Waves: none    Clinical Impression: Sinus tachycardia    Tomas Ambrose MD    Is requiring supplemental O2 patient will be admitted for further care discussed with the hospitalist    1. Acute respiratory failure with hypoxemia (HCC)    2. Seizure-like activity ([facility] [address]    3. Aspiration pneumonia, unspecified aspiration pneumonia type, unspecified laterality, unspecified part of lung ([facility] [address]    4. Altered mental status, unspecified altered mental status type          DISPOSITION/PLAN  PATIENT REFERRED TO:  No follow-up provider specified.   DISCHARGE MEDICATIONS:  New Prescriptions    No medications on file         MD Tomas Valle MD  05/13/22 6608

## 2022-05-13 NOTE — RT PROTOCOL NOTE
RT Nebulizer Bronchodilator Protocol Note    There is a bronchodilator order in the chart from a provider indicating to follow the RT Bronchodilator Protocol and there is an Initiate RT Bronchodilator Protocol order as well (see protocol at bottom of note). CXR Findings:  XR CHEST PORTABLE    Result Date: 5/13/2022  No acute abnormality. The findings from the last RT Protocol Assessment were as follows:  Smoking: Smoker 15 pack years or more  Respiratory Pattern: Regular pattern and RR 12-20 bpm  Breath Sounds: Slightly diminished and/or crackles  Cough: Strong, spontaneous, non-productive  Indication for Bronchodilator Therapy:    Bronchodilator Assessment Score: 3    Aerosolized bronchodilator medication orders have been revised according to the RT Nebulizer Bronchodilator Protocol below. Respiratory Therapist to perform RT Therapy Protocol Assessment initially then follow the protocol. Repeat RT Therapy Protocol Assessment PRN for score 0-3 or on second treatment, BID, and PRN for scores above 3. No Indications  adjust the frequency to every 6 hours PRN wheezing or bronchospasm, if no treatments needed after 48 hours then discontinue using Per Protocol order mode. If indication present, adjust the RT bronchodilator orders based on the Bronchodilator Assessment Score as indicated below. If a patient is on this medication at home then do not decrease Frequency below that used at home. 0-3  enter or revise RT bronchodilator order(s) to equivalent RT Bronchodilator order with Frequency of every 4 hours PRN for wheezing or increased work of breathing using Per Protocol order mode. 4-6  enter or revise RT Bronchodilator order(s) to two equivalent RT bronchodilator orders with one order with BID Frequency and one order with Frequency of every 4 hours PRN wheezing or increased work of breathing using Per Protocol order mode.          7-10  enter or revise RT Bronchodilator order(s) to two equivalent RT bronchodilator orders with one order with TID Frequency and one order with Frequency of every 4 hours PRN wheezing or increased work of breathing using Per Protocol order mode. 11-13  enter or revise RT Bronchodilator order(s) to one equivalent RT bronchodilator order with QID Frequency and an Albuterol order with Frequency of every 4 hours PRN wheezing or increased work of breathing using Per Protocol order mode. Greater than 13  enter or revise RT Bronchodilator order(s) to one equivalent RT bronchodilator order with every 4 hours Frequency and an Albuterol order with Frequency of every 2 hours PRN wheezing or increased work of breathing using Per Protocol order mode. RT to enter RT Home Evaluation for COPD & MDI Assessment order using Per Protocol order mode.     Electronically signed by Evon Dancer, RCP on 5/13/2022 at 8:33 AM

## 2022-05-13 NOTE — CONSULTS
Neurology Consultation  Date May, 13, 2022  Reason: Unresponsivenss  Requesting Provider: Dr. Shahnaz Cerrato        Chief complaints:    Patient was found in a car in a parking center     HPI    Lexy Lagunas is a 32 y.o. male who presents to the emergency department today for evaluation for altered mental status. EMS was dispatched to a young male, sitting in the car, unresponsive.  squad  They did give the patient 4 of Narcan intranasally, and to IV, and the patient did become more awake however when he arrives to the ED, he is snoring, does have some secretions noted. girlfriend states he has a history of one  Seizure 3 years agopatient was hypoxic at this time does arouse is able to verbalize a little bit at this time    Patient is accompanied by his girlfriend of 11 years. She describes him suddenly becoming rigid and extending his arms and legs. She denies seeing any clonic activity. She describes the seizure as prolonged at 10-20 minutes, but states seizure activity had stopped by the time medics arrived.     This is patient's second seizure according to his GF. Several years ago he had a similar episode that she did not witness directly  Patient was with his step-father who described a brief episode of similar abnormal movement immediately followed by confusion. Patient and his Friend state that the patient did smoke marijuana but otherwise does not use any other drugs. His urine toxicology however was positive for marijuana and Cocaine    Past Medical History:  Single convulsion 3 years ago  No other pertinent past medical history.       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  Range 5/13/2022 00:22 5/13/2022 01:40 5/13/2022 03:03   Sodium Latest Ref Range: 136 - 145 mmol/L 139     Potassium Latest Ref Range: 3.5 - 5.1 mmol/L 3.7     Chloride Latest Ref Range: 99 - 110 mmol/L 100     CO2 Latest Ref Range: 21 - 32 mmol/L 20 (L)     BUN,BUNPL Latest Ref Range: 7 - 20 mg/dL 15     Creatinine Latest Ref Range: 0.9 - 1.3 mg/dL 1.3     Anion Gap Latest Ref Range: 3 - 16  19 (H)     GFR Non- Latest Ref Range: >60  >60     GFR  Latest Ref Range: >60  >60     Lactic Acid, Sepsis Latest Ref Range: 0.4 - 1.9 mmol/L 4.9 (HH)  3.2 (H)   GLUCOSE, FASTING,GF Latest Ref Range: 70 - 99 mg/dL 219 (H)     CALCIUM, SERUM, [id number] Latest Ref Range: 8.3 - 10.6 mg/dL 9.3     Total Protein Latest Ref Range: 6.4 - 8.2 g/dL 7.9     Total CK Latest Ref Range: 39 - 308 U/L 348 (H)     Pro-BNP Latest Ref Range: 0 - 124 pg/mL 85     Troponin Latest Ref Range: <0.01 ng/mL <0.01     Albumin Latest Ref Range: 3.4 - 5.0 g/dL 4.6     Albumin/Globulin Ratio Latest Ref Range: 1.1 - 2.2  1.4     Alk Phos Latest Ref Range: 40 - 129 U/L 67     ALT Latest Ref Range: 10 - 40 U/L 38     AST Latest Ref Range: 15 - 37 U/L 60 (H)     Bilirubin Latest Ref Range: 0.0 - 1.0 mg/dL 0.3     Lipase Latest Ref Range: 13.0 - 60.0 U/L 63.0 (H)     Ethanol Lvl Latest Units: mg/dL 28     Amphetamine Screen, Urine Latest Ref Range: Negative <1000ng/mL   Neg    Barbiturate Screen, Ur Latest Ref Range: Negative <200 ng/mL   Neg    Benzodiazepine Screen, Urine Latest Ref Range: Negative <200 ng/mL   Neg    Cannabinoid Scrn, Ur Latest Ref Range: Negative <50 ng/mL   POSITIVE (A)    METHADONE SCREEN, URINE, [id number] Latest Ref Range: Negative <300 ng/mL   Neg    Opiate Scrn, Ur Latest Ref Range: Negative <300 ng/mL   Neg    PCP Screen, Urine Latest Ref Range: Negative <25 ng/mL   Neg    Propoxyphene Scrn, Ur Latest Ref Range: Negative <300 ng/mL   Neg    Cocaine Metabolite Screen, Urine Latest Ref Range: Negative <300 ng/mL   POSITIVE (A)    Oxycodone Urine Latest Ref Range: Negative <100 ng/ml   Neg    Drug Screen Comment: Unknown  see below    Acetaminophen Level Latest Ref Range: 10 - 30 ug/mL <5 (L)     Salicylate, Serum Latest Ref Range: 15.0 - 30.0 mg/dL <0.3 (L)       Results for Dyke Bolus (MRN [id number]) as of 5/13/2022 12:00   Ref. Range 5/13/2022 00:22   WBC Latest Ref Range: 4.0 - 11.0 K/uL 13.1 (H)   RBC Latest Ref Range: 4.20 - 5.90 M/uL 4.47   Hemoglobin Quant Latest Ref Range: 13.5 - 17.5 g/dL 12.4 (L)   Hematocrit Latest Ref Range: 40.5 - 52.5 % 39.9 (L)   MCV Latest Ref Range: 80.0 - 100.0 fL 89.2   MCH Latest Ref Range: 26.0 - 34.0 pg 27.7   MCHC Latest Ref Range: 31.0 - 36.0 g/dL 31.1   MPV Latest Ref Range: 5.0 - 10.5 fL 8.2   RDW Latest Ref Range: 12.4 - 15.4 % 13.8   Platelet Count Latest Ref Range: 135 - 450 K/uL 353   Neutrophils % Latest Units: % 65.6   Lymphocyte % Latest Units: % 25.7   Monocytes % Latest Units: % 7.0   Eosinophils % Latest Units: % 0.9   Basophils % Latest Units: % 0.8   Neutrophils Absolute Latest Ref Range: 1.7 - 7.7 K/uL 8.6 (H)   Lymphocytes Absolute Latest Ref Range: 1.0 - 5.1 K/uL 3.4   Monocytes Absolute Latest Ref Range: 0.0 - 1.3 K/uL 0.9   Eosinophils Absolute Latest Ref Range: 0.0 - 0.6 K/uL 0.1   Basophils Absolute Latest Ref Range: 0.0 - 0.2 K/uL 0.1     ImaResults for Dyke Bolus (MRN [id number]) as of 5/13/2022 12:00   Ref.  Range 5/13/2022 01:40   Color, UA Latest Ref Range: Straw/Yellow  Yellow   Clarity, UA Latest Ref Range: Clear  Clear   Glucose, UA Latest Ref Range: Negative mg/dL Negative   Bilirubin, Urine Latest Ref Range: Negative  Negative   Ketones, Urine Latest Ref Range: Negative mg/dL Negative   Specific Gravity, UA Latest Ref Range: 1.005 - 1.030  1.015   Blood, Urine Latest Ref Range: Negative  Negative   pH, UA Latest Ref Range: 5.0 - 8.0  6.5   Protein, UA Latest Ref Range: Negative mg/dL 30 (A) Urobilinogen, Urine Latest Ref Range: <2.0 E.U./dL 0.2   Nitrite, Urine Latest Ref Range: Negative  Negative   Leukocyte Esterase, Urine Latest Ref Range: Negative  Negative   Urine Type Unknown Voided   Urine Reflex to Culture Unknown Not Indicated   Hyaline Casts, UA Latest Ref Range: 0 - 8 /LPF 1   WBC, UA Latest Ref Range: 0 - 5 /HPF 2   RBC, UA Latest Ref Range: 0 - 4 /HPF 1   Epithelial Cells, UA Latest Ref Range: 0 - 5 /HPF 0   Bacteria, UA Latest Ref Range: None Seen /HPF None Seen   Microscopic Examination Unknown YES   Imaging;    CT HEAD WO CONTRAST: Patient Communication     Not Released  Not seen       Radiation Dose Estimates    No radiation information found for this patient  Narrative   EXAMINATION:   CT OF THE HEAD WITHOUT CONTRAST  5/13/2022 12:20 am       TECHNIQUE:   CT of the head was performed without the administration of intravenous   contrast. Automated exposure control, iterative reconstruction, and/or weight   based adjustment of the mA/kV was utilized to reduce the radiation dose to as   low as reasonably achievable.       COMPARISON:   None.       HISTORY:   ORDERING SYSTEM PROVIDED HISTORY: ams/seisure disorder           FINDINGS:   Motion artifact is present.       BRAIN/VENTRICLES: There is no acute intracranial hemorrhage, mass effect or   midline shift.  No abnormal extra-axial fluid collection.  The gray-white   differentiation is maintained without evidence of an acute infarct.  There is   no evidence of hydrocephalus.       ORBITS: The visualized portion of the orbits demonstrate no acute abnormality.       SINUSES: Mucous retention cyst or polyp in the right sphenoid sinus. Remainder of the visualized paranasal sinuses and mastoid air cells   demonstrate no acute abnormality.       SOFT TISSUES/SKULL:  No acute abnormality of the visualized skull or soft   tissues.           Impression   Motion limited exam demonstrates no acute intracranial abnormality.      EEG: No epileptiform discharges    Physical examination  Patient was asleep with loud snoring  He did wake up and talk to me but remained drowsy, fell a sleep a couple of time while I was taking to him  Speech is normal and no dysarthria or aphasia noted  Oriented to self and place and family members around him  Mother is RN  No facial asymmetry, tongue in midline, no nystagmus or tposis  Tongue in midline no tongue biting marks. Normal motor exam, no drift or weakness  Or tremor  DTR; wnl  Normal cerebellar exam  Gait not tested in PCU  Neck soft and supple, no meningism  Lungs clear to ascultation  Heart: regular s1 s2  ABd. Soft, + BS    A+P:    Epileltic convulsion  Drug use    AED is recommended since thds is his second seizures (although the second one seem to be triggered by cocaine eve).   [place] is recommended but patient refuses to be on antiepileptic drugs at his point    MRI brain is recommended  Needs follow up with outpatient neurology upon discharge    D/C tramadol due to its seizure threshold lowering effect  Advised to avoid al illict drugs  No driving discussed with patient and his family for at least 6 months  Seizure precautions      Thanks for consulting    Dr Miguel Gil  Neurology locum  [phone]

## 2022-05-13 NOTE — PROCEDURES
DATE OF EE2022     REFERRING PROVIDER: Brandi Alexis     CLINICAL HISTORY: 32 y. o. male who presents to the emergency department today for evaluation for altered mental status.  EMS was dispatched to a young male, sitting in the car, unresponsive    This is a 17-channel EEG performed without sleep deprivation. Hyperventilation was not performed. Photic stimulation was performed. The patient is described as sleeping soundly and smoring     CLINICAL INTERPRETATION:  The background rhythm activity is noted to be  6-7 Hz in the posterior parietal area, symmetric,   . Excessive slowing was seen in theta,  Hyperventilation was not performed. Photic stimulation was performed with poor driving seen. There was no evidence of epileptiform activity appreciated. Excessive snoring muscle artifact contaminated most of the study     IMPRESSION:  This is an abnormal EEG due to the excessive slowing seen  in theta, consistent with post ictal state  However, there was no  evidence of epileptiform activity appreciated.

## 2022-05-13 NOTE — H&P
Hospital Medicine History & Physical      Patient:  Shayna Medina  :   1990  MRN:   [id number]  Date of Service: 22    Chief Complaint   Patient presents with    Loss of Consciousness     in by EMS from car, [place]. patient responding to narcan, however girlfriend reports seizure history. HISTORY OF PRESENT ILLNESS:    Shayna Medina is a 32 y.o. male. He presented by ambulance for seizure like activity. He was in a car in a parking lot smoking marijuana with his girlfriend. She witnessed seizure-like activity. Afterward he was unresponsive. Medics tried giving him naloxone which they thought prompted some positive mental status response, but really patient's mental status remained abnormal and still remains abnormal by the time of my evaluation. Patient is accompanied by his girlfriend of 11 years. She describes him suddenly becoming rigid and extending his arms and legs. She denies seeing any clonic activity. She describes the seizure as prolonged at 10-20 minutes, but states seizure activity had stopped by the time medics arrived. This is patient's second seizure according to his GF. Several years ago he had a similar episode that she did not witness directly  Patient was with his step-father who described a brief episode of similar abnormal movement immediately followed by confusion. Patient was smoking marijuana at the time this occurred. He also turns out to have detectable serum ethanol and drug screen later returned positive for cocaine. Review of Systems:  All pertinent positives and negatives are as noted in the HPI section. All other systems were reviewed and are negative. History reviewed. No pertinent past medical history. none    History reviewed. No pertinent surgical history.    none      Prior to Admission medications    Not on File   confirmed no prescribed medications    Allergies:   Patient has no known allergies. Social:   reports that he has been smoking cigars. He has been smoking about 2.00 packs per day. He has never used smokeless tobacco.   reports current alcohol use. Social History     Substance and Sexual Activity   Drug Use No       History reviewed. No pertinent family history. PHYSICAL EXAM:  I performed this physical examination. Vitals:  Patient Vitals for the past 24 hrs:   BP Temp Temp src Pulse Resp SpO2   05/13/22 0110  95.9 °F (35.5 °C) Oral      05/13/22 0100 (!) 158/88   91  95 %   05/13/22 0045 (!) 157/92   89  97 %   05/13/22 0030    101 8 92 %   05/13/22 0015    110 27 (!) 85 %   05/13/22 0012 (!) 150/84   118 30 (!) 88 %     15 L/min O2 via NRB    GEN:  Appearance:  Age appropriate male in NAD . Level of Consciousness:  Somnolent but arousable. Able to follow commands. Can provide simple responses . Orientation:  Self only    HEENT: Sclera anicteric.  no conjunctival chemosis. moist mucus membranes. no specific or diagnostic oral lesions. NECK:  no signs of meningismus. Jugular veins not distended. Carotid pulses  2+.  no cervical lymphadenopathy. no thyromegaly. CV:  regular rhythm. normal S1 & S2.    no murmur. no rub.  no gallop. PULM:  Gurgling upper airway sounds audible. Patient is also having periods of apnea, hypopnea, and snoring. Chest excursion is symmetric. Breath sounds are generally vesicular but somewhat diminished throughout the right posterior fields. .    Adventitious sounds: There are rhonchii and crackles over the RLL. AB:  Abdominal shape is normal.  Bowel sounds are hypoactive but are present. Generally soft to palpation. no tenderness is present. no involuntary guarding. no rebound guarding. EXTR:  Skin is cool. Capillary refill brisk. no specific or pathognomic rash. no clubbing. no pitting edema. no active wound or ulcer.   Pulses 2+ x 4    NEURO: Grossly nonfocal.  Patient moves x 4 spontaneously and follows simple commands. He is interactive and provides simple responses to questions. LABS:  Lab Results   Component Value Date    WBC 13.1 (H) 05/13/2022    HGB 12.4 (L) 05/13/2022    HCT 39.9 (L) 05/13/2022    MCV 89.2 05/13/2022     05/13/2022     Lab Results   Component Value Date    CREATININE 1.3 05/13/2022    BUN 15 05/13/2022     05/13/2022    K 3.7 05/13/2022     05/13/2022    CO2 20 (L) 05/13/2022     Lab Results   Component Value Date    ALT 38 05/13/2022    AST 60 (H) 05/13/2022    ALKPHOS 67 05/13/2022    BILITOT 0.3 05/13/2022     Lab Results   Component Value Date    TROPONINI <0.01 05/13/2022     No results for input(s): PHART, LYE5KXH, PO2ART in the last 72 hours. IMAGING:  CT HEAD WO CONTRAST    Result Date: 5/13/2022  EXAMINATION: CT OF THE HEAD WITHOUT CONTRAST  5/13/2022 12:20 am TECHNIQUE: CT of the head was performed without the administration of intravenous contrast. Automated exposure control, iterative reconstruction, and/or weight based adjustment of the mA/kV was utilized to reduce the radiation dose to as low as reasonably achievable. COMPARISON: None. HISTORY: ORDERING SYSTEM PROVIDED HISTORY: ams/seisure disorder FINDINGS: Motion artifact is present. BRAIN/VENTRICLES: There is no acute intracranial hemorrhage, mass effect or midline shift. No abnormal extra-axial fluid collection. The gray-white differentiation is maintained without evidence of an acute infarct. There is no evidence of hydrocephalus. ORBITS: The visualized portion of the orbits demonstrate no acute abnormality. SINUSES: Mucous retention cyst or polyp in the right sphenoid sinus. Remainder of the visualized paranasal sinuses and mastoid air cells demonstrate no acute abnormality. SOFT TISSUES/SKULL:  No acute abnormality of the visualized skull or soft tissues. Motion limited exam demonstrates no acute intracranial abnormality.      XR CHEST PORTABLE    Result Date: 2022  EXAMINATION: ONE XRAY VIEW OF THE CHEST 2022 12:56 am COMPARISON: None. HISTORY: Reason for Exam: hypoxia, ams/loss of consciousness FINDINGS: Heart size is normal. No focal consolidation in the lungs. No pleural effusion or pneumothorax. No acute abnormality. I directly reviewed all recent imaging studies as well as pertinent prior studies. Radiology reports may or may not be available at the time of my review. EKG:  New and pertinent prior tracings were directly reviewed. My interpretation is as follows:  Sinus tachycardia    Active Hospital Problems    Diagnosis Date Noted    Acute respiratory failure with hypoxia ([facility] [J96.01] 2022     Priority: Medium    Aspiration pneumonia ([address] [J69.0] 2022     Priority: Medium    Tobacco abuse [Z72.0] 2022     Priority: Medium    Cannabis abuse [F12.10] 2022     Priority: Medium    Generalized seizure ([facility] [address] [R56.9] 2022     Priority: Medium    Cocaine abuse ([facility] [address] [F14.10] 2022     Priority: Medium       ASSESSMENT & PLAN  Seizure  -  Second lifetime seizure though tonight's seizure occurred in the context of illicit substance abuse. Will proceed with MRI brain w/ and w/o contrast, EEG, and will request input from neurology. -  Leave on seizure precautions. IV lorazepam made available for seizure  should any occur while hospitalized. Acute respiratory failure, Aspiration pneumonitis/pneumonia  -  Patient clearly aspirated during his seizure. -  Titrate level of respiratory support according to patient's needs. Target goal SpO2 = 90-94%. Currently patient is requiring 15 L/min O2 support. -  Empiric unasyn 3g IV q6h.  -  As-needed bronchodilators. Polysubstance abuse  -  Post ictal so cessation could not be counseled. NRT provided.     DVT prophylaxis: SCDs, lovenox  Code Status:  Full  Disposition:  Inpatient    Marleni Rubio MD MD

## 2022-05-13 NOTE — ED NOTES
Pt transported to [facility] in stable condition on the [place], report given to [facility] and G RN at bedside, and Pt left in stable condition at time of handoff.      Meet Alvarado RN  05/13/22 6712

## 2022-05-13 NOTE — ED NOTES
Pt arrived via EMS after being found unresponsive in a vehicle in a parking lot. Patient was given a total of 6mg of narcan by squad, 2mg IN and 4mg IV. Patient was snoring loudly on arrival, oxygen saturation low 80s. Patient given a total of 2mg IV narcan on arrival and began to wake up. Patient is currently on NRB, he is alert, denies drug use or seizure hx.      Jared Matute RN  05/13/22 1445

## 2022-05-14 ENCOUNTER — APPOINTMENT (OUTPATIENT)
Dept: MRI IMAGING | Age: 32
DRG: 871 | End: 2022-05-14

## 2022-05-14 VITALS
HEART RATE: 96 BPM | HEIGHT: 72 IN | TEMPERATURE: 98.7 F | OXYGEN SATURATION: 100 % | DIASTOLIC BLOOD PRESSURE: 85 MMHG | BODY MASS INDEX: 32.25 KG/M2 | WEIGHT: 238.1 LBS | SYSTOLIC BLOOD PRESSURE: 143 MMHG | RESPIRATION RATE: 12 BRPM

## 2022-05-14 LAB
A/G RATIO: 1.7 (ref 1.1–2.2)
ALBUMIN SERPL-MCNC: 4.3 G/DL (ref 3.4–5)
ALP BLD-CCNC: 63 U/L (ref 40–129)
ALT SERPL-CCNC: 27 U/L (ref 10–40)
ANION GAP SERPL CALCULATED.3IONS-SCNC: 9 MMOL/L (ref 3–16)
AST SERPL-CCNC: 19 U/L (ref 15–37)
BASOPHILS ABSOLUTE: 0 K/UL (ref 0–0.2)
BASOPHILS RELATIVE PERCENT: 0.2 %
BILIRUB SERPL-MCNC: 0.4 MG/DL (ref 0–1)
BUN BLDV-MCNC: 9 MG/DL (ref 7–20)
CALCIUM SERPL-MCNC: 9.5 MG/DL (ref 8.3–10.6)
CHLORIDE BLD-SCNC: 104 MMOL/L (ref 99–110)
CO2: 29 MMOL/L (ref 21–32)
CREAT SERPL-MCNC: 0.8 MG/DL (ref 0.9–1.3)
EOSINOPHILS ABSOLUTE: 0 K/UL (ref 0–0.6)
EOSINOPHILS RELATIVE PERCENT: 0.1 %
GFR AFRICAN AMERICAN: >60
GFR NON-AFRICAN AMERICAN: >60
GLUCOSE BLD-MCNC: 115 MG/DL (ref 70–99)
GLUCOSE BLD-MCNC: 89 MG/DL (ref 70–99)
HCT VFR BLD CALC: 37.1 % (ref 40.5–52.5)
HEMOGLOBIN: 11.7 G/DL (ref 13.5–17.5)
L. PNEUMOPHILA SEROGP 1 UR AG: NORMAL
LYMPHOCYTES ABSOLUTE: 0.7 K/UL (ref 1–5.1)
LYMPHOCYTES RELATIVE PERCENT: 5.9 %
MAGNESIUM: 2.3 MG/DL (ref 1.8–2.4)
MCH RBC QN AUTO: 28.2 PG (ref 26–34)
MCHC RBC AUTO-ENTMCNC: 31.5 G/DL (ref 31–36)
MCV RBC AUTO: 89.7 FL (ref 80–100)
MONOCYTES ABSOLUTE: 0.9 K/UL (ref 0–1.3)
MONOCYTES RELATIVE PERCENT: 7.7 %
NEUTROPHILS ABSOLUTE: 10.5 K/UL (ref 1.7–7.7)
NEUTROPHILS RELATIVE PERCENT: 86.1 %
PDW BLD-RTO: 13.6 % (ref 12.4–15.4)
PERFORMED ON: NORMAL
PLATELET # BLD: 330 K/UL (ref 135–450)
PMV BLD AUTO: 7.6 FL (ref 5–10.5)
POTASSIUM SERPL-SCNC: 4.5 MMOL/L (ref 3.5–5.1)
RBC # BLD: 4.13 M/UL (ref 4.2–5.9)
SODIUM BLD-SCNC: 142 MMOL/L (ref 136–145)
STREP PNEUMONIAE ANTIGEN, URINE: NORMAL
TOTAL PROTEIN: 6.9 G/DL (ref 6.4–8.2)
WBC # BLD: 12.2 K/UL (ref 4–11)

## 2022-05-14 PROCEDURE — 70553 MRI BRAIN STEM W/O & W/DYE: CPT

## 2022-05-14 PROCEDURE — 2580000003 HC RX 258: Performed by: INTERNAL MEDICINE

## 2022-05-14 PROCEDURE — 80053 COMPREHEN METABOLIC PANEL: CPT

## 2022-05-14 PROCEDURE — 83735 ASSAY OF MAGNESIUM: CPT

## 2022-05-14 PROCEDURE — 6360000004 HC RX CONTRAST MEDICATION: Performed by: PSYCHIATRY & NEUROLOGY

## 2022-05-14 PROCEDURE — A9577 INJ MULTIHANCE: HCPCS | Performed by: PSYCHIATRY & NEUROLOGY

## 2022-05-14 PROCEDURE — 6360000002 HC RX W HCPCS: Performed by: INTERNAL MEDICINE

## 2022-05-14 PROCEDURE — 36415 COLL VENOUS BLD VENIPUNCTURE: CPT

## 2022-05-14 PROCEDURE — 6370000000 HC RX 637 (ALT 250 FOR IP): Performed by: STUDENT IN AN ORGANIZED HEALTH CARE EDUCATION/TRAINING PROGRAM

## 2022-05-14 PROCEDURE — 85025 COMPLETE CBC W/AUTO DIFF WBC: CPT

## 2022-05-14 RX ORDER — AMLODIPINE BESYLATE 5 MG/1
5 TABLET ORAL DAILY
Qty: 30 TABLET | Refills: 3 | Status: SHIPPED | OUTPATIENT
Start: 2022-05-15 | End: 2022-05-14

## 2022-05-14 RX ORDER — AMLODIPINE BESYLATE 5 MG/1
5 TABLET ORAL DAILY
Qty: 30 TABLET | Refills: 2 | Status: SHIPPED | OUTPATIENT
Start: 2022-05-15

## 2022-05-14 RX ORDER — AMLODIPINE BESYLATE 5 MG/1
5 TABLET ORAL DAILY
Status: DISCONTINUED | OUTPATIENT
Start: 2022-05-14 | End: 2022-05-14 | Stop reason: HOSPADM

## 2022-05-14 RX ORDER — AMOXICILLIN AND CLAVULANATE POTASSIUM 875; 125 MG/1; MG/1
1 TABLET, FILM COATED ORAL 2 TIMES DAILY
Qty: 14 TABLET | Refills: 0 | Status: SHIPPED | OUTPATIENT
Start: 2022-05-14 | End: 2022-05-14 | Stop reason: SDUPTHER

## 2022-05-14 RX ORDER — AMOXICILLIN AND CLAVULANATE POTASSIUM 875; 125 MG/1; MG/1
1 TABLET, FILM COATED ORAL 2 TIMES DAILY
Qty: 14 TABLET | Refills: 0 | Status: SHIPPED | OUTPATIENT
Start: 2022-05-14 | End: 2022-05-21

## 2022-05-14 RX ADMIN — SODIUM CHLORIDE 3000 MG: 900 INJECTION INTRAVENOUS at 11:06

## 2022-05-14 RX ADMIN — SODIUM CHLORIDE 3000 MG: 900 INJECTION INTRAVENOUS at 03:24

## 2022-05-14 RX ADMIN — ENOXAPARIN SODIUM 40 MG: 100 INJECTION SUBCUTANEOUS at 11:00

## 2022-05-14 RX ADMIN — GADOBENATE DIMEGLUMINE 20 ML: 529 INJECTION, SOLUTION INTRAVENOUS at 10:18

## 2022-05-14 RX ADMIN — AMLODIPINE BESYLATE 5 MG: 5 TABLET ORAL at 11:00

## 2022-05-14 RX ADMIN — SODIUM CHLORIDE: 9 INJECTION, SOLUTION INTRAVENOUS at 11:04

## 2022-05-14 ASSESSMENT — PAIN SCALES - GENERAL: PAINLEVEL_OUTOF10: 0

## 2022-05-14 NOTE — DISCHARGE SUMMARY
Hospital Medicine Discharge Summary    Patient ID: Dennys Hanley      Patient's PCP: No primary care provider on file. Admit Date: 5/13/2022     Discharge Date:   5/14/2022    Admitting Provider: Madalyn Anderson MD     Discharge Provider: John Shields MD     Discharge Diagnoses: Active Hospital Problems    Diagnosis     Acute respiratory failure with hypoxia ([facility] [address] [J96.01]      Priority: Medium    Aspiration pneumonia ([address] [J69.0]      Priority: Medium    Tobacco abuse [Z72.0]      Priority: Medium    Cannabis abuse [F12.10]      Priority: Medium    Generalized seizure ([address] [R56.9]      Priority: Medium    Cocaine abuse ([facility] [address] [F14.10]      Priority: Medium       The patient was seen and examined on day of discharge and this discharge summary is in conjunction with any daily progress note from day of discharge. Hospital Course:    32 y.o. male. He presented by ambulance for seizure like activity. He was in a car in a parking lot smoking marijuana with his girlfriend. She witnessed seizure-like activity. Afterward he was unresponsive. Medics tried giving him naloxone which they thought prompted some positive mental status response, but really patient's mental status remained abnormal and still remains abnormal by the time of my evaluation. MRI was unremarkable, EEG with possible seizure activity, neurology has been consulted, recommended antiepileptic medication, but patient refused at this point, patient will need to follow-up with neurology outpatient, advised to avoid illicit drug use, no driving for about 6 months, follow-up with neurology and PCP outpatient, patient was started on Norvasc for hypertension.       Physical Exam Performed:     BP (!) 143/85   Pulse 96   Temp 98.7 °F (37.1 °C) (Temporal)   Resp 12   Ht 6' (1.829 m)   Wt 238 lb 1.6 oz (108 kg)   SpO2 100%   BMI 32.29 kg/m²       General appearance:  No apparent distress, appears stated age and cooperative. HEENT:  Normal cephalic, atraumatic without obvious deformity. Pupils equal, round, and reactive to light. Extra ocular muscles intact. Conjunctivae/corneas clear. Neck: Supple, with full range of motion. No jugular venous distention. Trachea midline. Respiratory:  Normal respiratory effort. Clear to auscultation, bilaterally without Rales/Wheezes/Rhonchi. Cardiovascular:  Regular rate and rhythm with normal S1/S2 without murmurs, rubs or gallops. Abdomen: Soft, non-tender, non-distended with normal bowel sounds. Musculoskeletal:  No clubbing, cyanosis or edema bilaterally. Full range of motion without deformity. Skin: Skin color, texture, turgor normal.  No rashes or lesions. Neurologic:  Neurovascularly intact without any focal sensory/motor deficits. Cranial nerves: II-XII intact, grossly non-focal.  Psychiatric:  Alert and oriented, thought content appropriate, normal insight  Capillary Refill: Brisk,< 3 seconds   Peripheral Pulses: +2 palpable, equal bilaterally       Labs: For convenience and continuity at follow-up the following most recent labs are provided:      CBC:    Lab Results   Component Value Date    WBC 12.2 05/14/2022    HGB 11.7 05/14/2022    HCT 37.1 05/14/2022     05/14/2022       Renal:    Lab Results   Component Value Date     05/14/2022    K 4.5 05/14/2022     05/14/2022    CO2 29 05/14/2022    BUN 9 05/14/2022    CREATININE 0.8 05/14/2022    CALCIUM 9.5 05/14/2022         Significant Diagnostic Studies    Radiology:   MRI BRAIN W WO CONTRAST   Final Result   1. Patient motion limits evaluation. 2. No convincing acute intracranial abnormality. No acute infarct. 3. Scattered mucosal thickening of the paranasal sinuses. XR CHEST PORTABLE   Final Result   No acute abnormality. CT HEAD WO CONTRAST   Final Result   Motion limited exam demonstrates no acute intracranial abnormality.                 Consults:     IP CONSULT TO NEUROLOGY    Disposition: Home    Condition at Discharge: Stable    Discharge Instructions/Follow-up: Follow-up with PCP, follow-up with neurology outpatient, no driving for 6 months recommendations per neurology, start taking Norvasc, complete course of Augmentin for 7 days    Code Status:  Full Code     Activity: activity as tolerated    Diet: cardiac diet      Discharge Medications:     Current Discharge Medication List           Details   amLODIPine (NORVASC) 5 MG tablet Take 1 tablet by mouth daily  Qty: 30 tablet, Refills: 2      amoxicillin-clavulanate (AUGMENTIN) 875-125 MG per tablet Take 1 tablet by mouth 2 times daily for 7 days  Qty: 14 tablet, Refills: 0             Time Spent on discharge is more than 30 minutes in the examination, evaluation, counseling and review of medications and discharge plan. Signed:    Luz Marina Jean MD   5/14/2022      Thank you No primary care provider on file. for the opportunity to be involved in this patient's care. If you have any questions or concerns, please feel free to contact me at [phone].

## 2022-05-14 NOTE — DISCHARGE INSTR - COC
Continuity of Care Form    Patient Name: Asim Salgado   :  1990  MRN:  [id number]    Admit date:  2022  Discharge date:  ***    Code Status Order: Full Code   Advance Directives:      Admitting Physician:  Poonam Dasilva MD  PCP: No primary care provider on file. Discharging Nurse: [facility] Unit/Room#: [id number]  Discharging Unit Phone Number: ***    Emergency Contact:   Extended Emergency Contact Information  Primary Emergency Contact: Dutch Fontenot,[address] Phone: [phone]  Relation: Parent  Secondary Emergency Contact: Afsaneh Rogers  Mobile Phone: [phone]  Relation: Girlfriend    Past Surgical History:  History reviewed. No pertinent surgical history. Immunization History: There is no immunization history on file for this patient.     Active Problems:  Patient Active Problem List   Diagnosis Code    Acute respiratory failure with hypoxia (HCC) J96.01    Aspiration pneumonia (HCC) J69.0    Tobacco abuse Z72.0    Cannabis abuse F12.10    Generalized seizure ([address] R56.9    Cocaine abuse ([facility] [address] F14.10       Isolation/Infection:   Isolation            No Isolation          Patient Infection Status       None to display            Nurse Assessment:  Last Vital Signs: BP (!) 143/85   Pulse 96   Temp 98.7 °F (37.1 °C) (Temporal)   Resp 12   Ht 6' (1.829 m)   Wt 238 lb 1.6 oz (108 kg)   SpO2 100%   BMI 32.29 kg/m²     Last documented pain score (0-10 scale): Pain Level: 0  Last Weight:   Wt Readings from Last 1 Encounters:   22 238 lb 1.6 oz (108 kg)     Mental Status:  {IP PT MENTAL STATUS:}    IV Access:  { DEJAH IV ACCESS:848332275}    Nursing Mobility/ADLs:  Walking   {CHP DME OWFI:907774485}  Transfer  {CHP DME WQHA:686664408}  Bathing  {CHP DME HSHI:475543367}  Dressing  {CHP DME OLDI:273181014}  Toileting  {CHP DME LQVR:837145122}  Feeding  {CHP DME IDNK:982426041}  Med Admin  {CHP DME JXEA:379931153}  Med Delivery   { DEJAH MED Delivery:246206550}    Wound Care Documentation and Therapy:        Elimination:  Continence: Bowel: {YES / DF:07135}  Bladder: {YES / TQ:40187}  Urinary Catheter: {Urinary Catheter:340685233}   Colostomy/Ileostomy/Ileal Conduit: {YES / RK:22226}       Date of Last BM: ***    Intake/Output Summary (Last 24 hours) at 2022 1204  Last data filed at 2022 1029  Gross per 24 hour   Intake 1086.41 ml   Output 701 ml   Net 385.41 ml     I/O last 3 completed shifts: In: 2376.4 [P.O.:240;  I.V.:903; IV Piggyback:1233.4]  Out:  [Urine:]    Safety Concerns:     [address] Safety Concerns:389337370}    Impairments/Disabilities:      [address] Impairments/Disabilities:559224018}    Nutrition Therapy:  Current Nutrition Therapy:   [address] Diet List:687739244}    Routes of Feeding: {CHP DME Other Feedings:896305379}  Liquids: {Slp liquid thickness:92373}  Daily Fluid Restriction: {CHP DME Yes amt example:775025631}  Last Modified Barium Swallow with Video (Video Swallowing Test): {Done Not Done MVXE:861475533}    Treatments at the Time of Hospital Discharge:   Respiratory Treatments: ***  Oxygen Therapy:  {Therapy; copd oxygen:88422}  Ventilator:    { CC Vent GAHN:117354809}    Rehab Therapies: {THERAPEUTIC INTERVENTION:2879254720}  Weight Bearing Status/Restrictions: [employer]  Weight Bearin}  Other Medical Equipment (for information only, NOT a DME order):  {EQUIPMENT:368718208}  Other Treatments: ***    Patient's personal belongings (please select all that are sent with patient):  {P DME Belongings:551786249}    RN SIGNATURE:  {Esignature:741799633}    CASE MANAGEMENT/SOCIAL WORK SECTION    Inpatient Status Date: ***    Readmission Risk Assessment Score:  Readmission Risk              Risk of Unplanned Readmission:  14           Discharging to Facility/ Agency   Name:   Address:  Phone:  Fax:    Dialysis Facility (if applicable)   Name:  Address:  Dialysis Schedule:  Phone:  Fax:    /Social Worker signature: {Esignature:495892665}    PHYSICIAN SECTION    Prognosis: Fair    Condition at Discharge: Stable    Rehab Potential (if transferring to Rehab): {Prognosis:0709203986}    Recommended Labs or Other Treatments After Discharge: Follow-up with neurology outpatient, follow-up with PCP within 1 week of discharge, no driving for 6 months per neurology recommendation    Physician Certification: I certify the above information and transfer of Garrett Hashimoto  is necessary for the continuing treatment of the diagnosis listed and that he requires {Admit to Appropriate Level of Care:16698} for {GREATER/LESS:717558447} 30 days.      Update Admission H&P: {CHP DME Changes in SUBMD:431594236}    PHYSICIAN SIGNATURE:  {Esignature:842502537}

## 2022-05-14 NOTE — DISCHARGE INSTR - DIET

## 2022-05-14 NOTE — PROGRESS NOTES
Patient discharged, aware of medications needing picked up at pharmacy. Aware of follow up appointments. All questions answered. Spoke with patient's mom about finding him a PCP, she stated she is already working on it. IV removed. Patient leaving via wheelchair with family. AVS agreement in patient's chart.

## 2022-05-16 LAB
ESTIMATED AVERAGE GLUCOSE: 99.7 MG/DL
HBA1C MFR BLD: 5.1 %

## 2022-05-25 NOTE — PROGRESS NOTES
Physician Progress Note      Rajinder Marquez  CSN #:                  [id number]  :                       1990  ADMIT DATE:       2022 12:11 AM  DISCH DATE:        2022 12:48 PM  RESPONDING  PROVIDER #:        Fela Mar          QUERY TEXT:    Pt admitted with altered mental status, seizure like activity, drug use, and   sepsis. Pt noted to have abnormal mental status on DCS. If possible, please   document in the progress notes and discharge summary if you are evaluating and   / or treating any of the following: The medical record reflects the following:  Risk Factors: cocaine and marijuana use, seizure hx, sepsis    Clinical Indicators: DCS, Medics tried giving him naloxone which they thought   prompted some positive mental status response, but really patient's mental   status remained abnormal and still remains abnormal by the time of my   evaluation    HP, Somnolent but arousable, Orientation - Self only    ED, Patient will awaken to name, and will move his head when trying to place   oxygen on. 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 Query created by:  Lucia Farris on 2022 10:00 AM      Electronically signed by:  Fela Mar 2022 2:32 PM